# Patient Record
(demographics unavailable — no encounter records)

---

## 2025-04-07 NOTE — PHYSICAL EXAM
[General Appearance - Alert] : alert [General Appearance - In No Acute Distress] : in no acute distress [General Appearance - Well Developed] : well developed [General Appearance - Well-Appearing] : healthy appearing [Sclera] : the sclera and conjunctiva were normal [Extraocular Movements] : extraocular movements were intact [Outer Ear] : the ears and nose were normal in appearance [Neck Appearance] : the appearance of the neck was normal [Exaggerated Use Of Accessory Muscles For Inspiration] : no accessory muscle use [Edema] : there was no peripheral edema [FreeTextEntry1] : mild synovitis noted between R 2nd and 3rd MCP joints (uncahnged); mild tenderness to R. shoulder but normal ROM [Skin Color & Pigmentation] : normal skin color and pigmentation [] : no rash [Skin Lesions] : no skin lesions [No Focal Deficits] : no focal deficits [Oriented To Time, Place, And Person] : oriented to person, place, and time [Affect] : the affect was normal [Mood] : the mood was normal

## 2025-04-07 NOTE — ASSESSMENT
[FreeTextEntry1] : 60F with reported longstanding seropositive RA diagnosed at age 35, previously followed with rheumatologist Dr. Hussein, was reportedly in remission for better part of the last decade, then more recently resumed on subQ MTX 15 mg qweekly in 2023- followed by transition back to IV rituximab in 10/2023 due to recurrent swelling of b/l hands R>L as well as pain in b/l wrists and R. shoulder. She did have improvement on rituximab but unfortunately now that biologics/specialty meds are not covered by insurance, her treatment options are limited.  I have advised that she resume MTX 15 mg qweekly with daily folic acid. if she has GI upset that is prolonged, she was advised to call my office and we will consider changing therapy. US duplex of LLE was ordered due to asymmetric LLE edema; to rule out a DVT, especially as she uses tobacco.   Patient to follow up in 6 weeks.

## 2025-04-07 NOTE — HISTORY OF PRESENT ILLNESS
[___ Month(s) Ago] : [unfilled] month(s) ago [FreeTextEntry1] : 4/2025 followup: pain mainly in b/l hands, wrists, shoulders; currently not on any RA meds; all the specialty meds were denied by insurance. she has some wheezing she states. she continues to smoke cigarettes and has COPD. denies ocular inflammatory symptoms; she did have floaters and saw ophtho recently- was not told of any inflammation.  She is reporting recent onset asymmetric left leg edema compared to right.

## 2025-04-07 NOTE — END OF VISIT
[Time Spent: ___ minutes] : I have spent [unfilled] minutes of time on the encounter which excludes teaching and separately reported services.
[Time Spent: ___ minutes] : I have spent [unfilled] minutes of time on the encounter which excludes teaching and separately reported services.
oriented to person, place and time

## 2025-04-07 NOTE — REVIEW OF SYSTEMS
[As Noted in HPI] : as noted in HPI [Wheezing] : wheezing [Arthralgias] : arthralgias [Joint Pain] : joint pain [Joint Stiffness] : joint stiffness [Negative] : Heme/Lymph

## 2025-04-07 NOTE — DATA REVIEWED
[FreeTextEntry1] : ESR 24 (normal for age) in 10/2024  TSH normal 10/2024 hepatitis, quantiferon negative 10/2024

## 2025-04-21 NOTE — PLAN
[FreeTextEntry1] : 60-year-old female for upper respiratory infection.  Course Augmentin discussed.  Signs and symptoms warranting further eval advised.  All questions answered.  Patient voiced understanding and in agreement to plan.  Return to clinic as recommended.

## 2025-04-21 NOTE — REVIEW OF SYSTEMS
[Fever] : no fever [Fatigue] : no fatigue [Discharge] : no discharge [Earache] : earache [Chest Pain] : no chest pain [Palpitations] : no palpitations [Abdominal Pain] : no abdominal pain [Constipation] : no constipation [Diarrhea] : diarrhea [Dysuria] : no dysuria [Headache] : no headache

## 2025-04-21 NOTE — HISTORY OF PRESENT ILLNESS
[FreeTextEntry8] : 60-year-old female for upper respiratory infection symptoms x 3 weeks.  Noting significant congestion and mucus production which is green.

## 2025-05-27 NOTE — ASSESSMENT
[FreeTextEntry1] : Ms. BLAYNE PABON is a 60-year-old with persistent bilateral lower extremity discomfort and left leg swelling.  No vascular pathology found on venous ultrasound and patient has intact pulses in both legs without evidence of arterial insufficiency.

## 2025-05-27 NOTE — HISTORY OF PRESENT ILLNESS
[FreeTextEntry1] : Ms. BLAYNE PABON is a 60-year-old F with RA who presents for initial evaluation of bilateral leg discomfort for the past few months. Ms. PABON leg pain is associated with leg swelling (left >right) and itchiness.  She has multiple wounds on her left lower extremity around the ankle.  Pain described as dull that is constant.  Pain occurs in the morning then improves but then progresses as the day goes on that makes it hard to walk at the end of the day.  Description does not sound like claudication.  She states she has pins and needles in her feet.  Her symptoms have persisted despite conservative management with leg elevation. Her symptoms interfere with ADLs such as work and daily chores. Ms. PABON risk factors for venous disease are obesity, pregnancyx4. 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 No previous treatment, vein procedures and vein surgeries.  She denies history of lower extremity claudication or rest pain.

## 2025-05-27 NOTE — PLAN
[TextEntry] : I have reviewed tests with patient.  Suggest compression stockings 15-20 to be worn daily and not at night for swelling. Follow up with PMD or rheumatology regarding lower extremity symptoms that suggest arthritic in nature. No vascular follow up needed at this time.  Follow up as needed.  I have spent a total of 30 minutes with the patient and coordinating care.

## 2025-05-27 NOTE — PHYSICAL EXAM
[2+] : left 2+ [1+] : left 1+ [FreeTextEntry1] : 1+ edema, left > right No skin changes spider veins bilateral thighs Healed ulcers LLE anterior shin Palpable DP pulses bilaterally

## 2025-06-12 NOTE — PHYSICAL EXAM
[General Appearance - Alert] : alert [General Appearance - In No Acute Distress] : in no acute distress [General Appearance - Well Developed] : well developed [General Appearance - Well-Appearing] : healthy appearing [Sclera] : the sclera and conjunctiva were normal [Extraocular Movements] : extraocular movements were intact [Outer Ear] : the ears and nose were normal in appearance [Neck Appearance] : the appearance of the neck was normal [Exaggerated Use Of Accessory Muscles For Inspiration] : no accessory muscle use [FreeTextEntry1] : mild synovitis noted between R 2nd and 3rd MCP joints (uncahnged); normal ROM of shoulders [Skin Color & Pigmentation] : normal skin color and pigmentation [] : no rash [Skin Lesions] : no skin lesions [No Focal Deficits] : no focal deficits [Oriented To Time, Place, And Person] : oriented to person, place, and time [Affect] : the affect was normal [Mood] : the mood was normal

## 2025-06-12 NOTE — DATA REVIEWED
[FreeTextEntry1] : ESR normal 4/2025 CBC normal except for high eosinophil count 12.6% in 4/2025 (has hx of allergic rash)

## 2025-06-12 NOTE — HISTORY OF PRESENT ILLNESS
[___ Month(s) Ago] : [unfilled] month(s) ago [FreeTextEntry1] : 6/2025: taking MTX 15 mg qweekly since last visit with already some improvement after 2-3 doses. Not taking folic acid.

## 2025-06-12 NOTE — REVIEW OF SYSTEMS
[Arthralgias] : arthralgias [Joint Stiffness] : joint stiffness [As Noted in HPI] : as noted in HPI [Negative] : Neurological

## 2025-06-12 NOTE — ASSESSMENT
[FreeTextEntry1] : 60F with reported longstanding seropositive RA diagnosed at age 35, previously followed with rheumatologist Dr. Hussein, was reportedly in remission for better part of the last decade, then more recently resumed on subQ MTX 15 mg qweekly in 2023- followed by transition back to IV rituximab in 10/2023 due to recurrent swelling of b/l hands R>L as well as pain in b/l wrists and R. shoulder. She did have improvement on rituximab but unfortunately now that biologics/specialty meds are not covered by insurance, her treatment options are limited. Started on MTX 15 mg qweekly in 5/2025 with some improvement already after 2-3 doses. Has not been taking folic acid.  Plan: She was advised to take daily folic acid 1 mg with her methotrexate to prevent unwanted side effects such as oral ulcers or hair loss. Continue MTX 15 mg qweekly for now. Will check labs (CMP, CBC, ESR) in 2 weeks- if at that time ESR remains elevated, or patient continues to experience significant joint pain, will plan to increase dose of MTX.  Follow up in 3 months.